# Patient Record
(demographics unavailable — no encounter records)

---

## 2024-10-31 NOTE — HISTORY OF PRESENT ILLNESS
[FreeTextEntry1] : NPV- FBSE [de-identified] : Romy Armando 48 y/o F presents for FBSE   Lists the following concerns today: -bump on anterior neck, picks at it. hasnt gone away in > months  Personal history of skin cancer: -BCC on L dorsal hand s/p WLE.  2023  SH diligent with SPF, does not wear hats bc of her thick hair works as a food and wine writer, travels to [place] frequently

## 2024-10-31 NOTE — PHYSICAL EXAM
[FreeTextEntry3] : Exam of external genitalia was deferred.  -On the trunk and upper/lower extremities bilaterally, are multiple scattered tan to brown macules and papules with regular borders. Some of these were examined dermoscopically and had reassuring features. -Scattered tan smooth macules with regular borders and pigmentation. Many of these were examined with dermoscopy and had benign features. -Scattered on the trunk and extremities are several cherry red papules. -anterior neck: scaly tan papule with cerebriform features at border -right thoracic back: brown macule with white surrounding halo, ill defined pigmentation on dermoscopy. 5x5mm

## 2024-10-31 NOTE — ASSESSMENT
[FreeTextEntry1] : #Skin cancer screening  Sun protection reviewed. The patient was educated regarding appropriate sun protection measures, including wearing sunscreen with SPF 30 or higher when outdoors, sun protective clothing, and sun avoidance.   #Cherry angioma #Benign appearing nevi #Solar lentigines Patient was reassured of the benign nature of these findings. No further treatment needed at this time. If any lesion changes or becomes symptomatic I recommend follow-up  # Seborrheic Keratoses, clinically inflamed vs. prurigo nodule Natural history and treatment options discussed. Recommend treatment with cryotherapy to which the patient is agreeable.   Cryotherapy Procedure Note. Lesion(s) / Location treated: 1, anterior neck After obtaining verbal consent, including counseling on risks of dyspigmentation and scarring, liquid nitrogen was applied to the above lesions. The patient tolerated the procedure well. Wound care was reviewed. Counselled pt to avoid picking. can apply hydrocolloid patch (pimple patch) to help cover lesion during healing.   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 DN , melanoma  Photographs obtained.   Recommended lesion removal by punch technique.  Reason: tumor of uncertain behavior / irritated lesion.   Excision by 5mm Punch Technique with Simple Closure Procedure Note.   Lesion size: 5mm   Cleaned with: alcohol wipe Closure Length: 0.5 cm. Simple closure: 4-0 Chromic interrupted skin sutures.   After discussion of risks, written informed consent was obtained.  a time out was performed.   Following intradermal injection of 1% lidocaine with 1:1000,000 epinephrine, an excision by punch technique was performed down to the subcutaneous tissue.   A bandage was placed and wound care was reviewed with the patient. The specimen was sent to Pathology for review.  Estimated blood loss: Minimal. Complications: none.  RTC 12 mo or sooner prn

## 2024-11-01 NOTE — HISTORY OF PRESENT ILLNESS
[Patient reported mammogram was normal] : Patient reported mammogram was normal [Patient reported PAP Smear was abnormal] : Patient reported PAP Smear was abnormal [Mammogramdate] : 12/04/2023 [PapSmeardate] : 03/21/2023 [ColonoscopyDate] : 12/16/22 [LMPDate] : 09/23/2024 [MensesFreq] : 6-8weeks [MensesLength] : 5 [TextBox_6] : 09/23/2024 [FreeTextEntry1] : 09/23/2024

## 2024-11-01 NOTE — PLAN
[FreeTextEntry1] : 46yo  premenopausal female PMH anxiety, abnormal pap with +HPV s/p multiple benign colposcopies and newly diagnosed HTN and HLD presents for annual exam and questions related to hormone replacement.  #Annual visits  - Pap cytology & HPV collected, f/u results  - Pt declines STI testing  - Pt s/p flu shot, COVID booster  - UTD with mammography, colonoscopy  #Perimenopausal symptoms/ contraception/ irregular menses  - Discussed with patient concerns related to Junel - given her age, newly diagnosed HTN and HLD it is no longer recommended to continue OCP use. Explained that using estrogen-containing contraception increases cardiovascular risk including MI and stroke. Recommended immediate discontinuation of Junel. Pt in understanding and agreement to stop Junel. - Discussed limited evidence for DHEA in alleviating perimenopausal symptoms and lab results showing elevated testosterone levels, and the possibility for testosterone to contribute to HTN.  - Discussed micronized progesterone is typically safe however using this in conjunction with Junel which contains progesterone is likely too high a dose and could be contributing to hyperlipidemia. Would not recommend concurrent use of multiple sources of systemic progesterone - Recommend follow up with [facility] certified physician Dr. Berger on  for further recommendations  - In the meantime reiterated that patient should STOP Junel. Can continue DHEA/micronized progesterone while awaiting further recommendations - Pt concerned about contraception given she believes she is still menstruating, recommended condom use for pregnancy prevention in the meantime. She is eligible for POP however would not recommend concurrent use with micronized progestin. Patient had negative experience with IUD in past (irregular bleeding).  - Patient notes that between recurrent HPV, bothersome abnormal bleeding patterns, and no desire for future fertility, she has been counseled about hysterectomy. She is possibly interested in this going forward however she notes she is moving to [place] in 2025.  - Patient will need further guidance on management of perimenopausal symptoms with desire for control over menstrual bleeding and contraception - Pt in understanding and agreement with plan, all questions answered - RTC on  for consultation with Dr. Celine Holland, PGY2 Pt seen and discussed with Dr. Curran attending physician

## 2024-11-01 NOTE — PHYSICAL EXAM
[Chaperone Present] : A chaperone was present in the examining room during all aspects of the physical examination [[id number]] : A chaperone was present during the pelvic exam. [Appropriately responsive] : appropriately responsive [Alert] : alert [No Acute Distress] : no acute distress [Labia Majora] : normal [Labia Minora] : normal [Discharge] : a  ~M vaginal discharge was present [Scant] : scant [Clear] : clear [Normal] : normal [Uterine Adnexae] : non-palpable [Oriented x3] : oriented x3 [FreeTextEntry2] : Tearful at times when discussing irregular menses and frustration related to this. [FreeTextEntry8] : No CMT, no adnexal fullness or tenderness to palpation

## 2024-11-01 NOTE — PLAN
[FreeTextEntry1] : 46yo  premenopausal female PMH anxiety, abnormal pap with +HPV s/p multiple benign colposcopies and newly diagnosed HTN and HLD presents for annual exam and questions related to hormone replacement.  #Annual visits  - Pap cytology & HPV collected, f/u results  - Pt declines STI testing  - Pt s/p flu shot, COVID booster  - UTD with mammography, colonoscopy  #Perimenopausal symptoms/ contraception/ irregular menses  - Discussed with patient concerns related to Junel - given her age, newly diagnosed HTN and HLD it is no longer recommended to continue OCP use. Explained that using estrogen-containing contraception increases cardiovascular risk including MI and stroke. Recommended immediate discontinuation of Junel. Pt in understanding and agreement to stop Junel. - Discussed limited evidence for DHEA in alleviating perimenopausal symptoms and lab results showing elevated testosterone levels, and the possibility for testosterone to contribute to HTN.  - Discussed micronized progesterone is typically safe however using this in conjunction with Junel which contains progesterone is likely too high a dose and could be contributing to hyperlipidemia. Would not recommend concurrent use of multiple sources of systemic progesterone - Recommend follow up with [facility] certified physician Dr. Berger on  for further recommendations  - In the meantime reiterated that patient should STOP Junel. Can continue DHEA/micronized progesterone while awaiting further recommendations - Pt concerned about contraception given she believes she is still menstruating, recommended condom use for pregnancy prevention in the meantime. She is eligible for POP however would not recommend concurrent use with micronized progestin. Patient had negative experience with IUD in past (irregular bleeding).  - Patient notes that between recurrent HPV, bothersome abnormal bleeding patterns, and no desire for future fertility, she has been counseled about hysterectomy. She is possibly interested in this going forward however she notes she is moving to [place] in 2025.  - Patient will need further guidance on management of perimenopausal symptoms with desire for control over menstrual bleeding and contraception - Pt in understanding and agreement with plan, all questions answered - RTC on  for consultation with Dr. Celine Holland, PGY2 Pt seen and discussed with Dr. Curran attending physician

## 2024-11-01 NOTE — PHYSICAL EXAM
[Chaperone Present] : A chaperone was present in the examining room during all aspects of the physical examination [[id number]] : A chaperone was present during the pelvic exam. [Appropriately responsive] : appropriately responsive [Alert] : alert [No Acute Distress] : no acute distress [Labia Majora] : normal [Labia Minora] : normal [Discharge] : a  ~M vaginal discharge was present [Scant] : scant [Clear] : clear [Normal] : normal [Uterine Adnexae] : non-palpable [Oriented x3] : oriented x3 [FreeTextEntry2] : Tearful at times when discussing irregular menses and frustration related to this. [FreeTextEntry8] : No CMT, no adnexal fullness or tenderness to palpation

## 2024-12-16 NOTE — PLAN
[FreeTextEntry1] : 48 yo presents for HRT consultation -patient advised that due to her HLD and not on any statin she should stop taking any HRT until she is cleared by cardiologist, patient is going to make an appointment with her cardiologist -FSH drawn today -patient to make appointment with clinic after she see's cardiologist to address HRT options but right now with her LDL>150 she needs clearance -patient has colposcopy next month and had mammogram two weeks ago -all questions answered and patient expressed understanding  NS PGY2

## 2024-12-16 NOTE — END OF VISIT
[] : Resident [FreeTextEntry3] : lengthy discussion with pt came off pill recently, no period since the withdrawal bleed from pill which was a few months ago she is taking a compounded hrt: d/w pt I recommend routine FDA approved products sold in regular pharmacy (like estrogen patch and micronized progesterone pill) but at moment she is could be at cardiac high risk she did see cardiologist NP in August but at that time they did not have recent LIPID and her lipids were much higher when checked in September. advised to see cardiologist now. d/w pt some herbal nonhormal remedies like Relizen. dw  pt also Veozah as an option that is totally nonhormonal but she will f/u after seeing cardiologist

## 2024-12-16 NOTE — HISTORY OF PRESENT ILLNESS
[Previously active] : previously active [PapSmeardate] : 10/31/2024 [TextBox_31] : HPV +    [place] schedule for JAN. 2025 [LMPDate] : 11/3/2024 [FreeTextEntry1] : 11/3/2024

## 2025-01-08 NOTE — REVIEW OF SYSTEMS
[Palpitations] : palpitations [Fever] : no fever [Chills] : no chills [SOB] : no shortness of breath [Dyspnea on exertion] : not dyspnea during exertion [Lower Ext Edema] : no extremity edema [Orthopnea] : no orthopnea [PND] : no PND [Syncope] : no syncope [Cough] : no cough [Nausea] : no nausea [Vomiting] : no vomiting [Diarrhea] : diarrhea [Dizziness] : no dizziness [Numbness (Hypoesthesia)] : no numbness [Weakness] : no weakness [Speech Disturbance] : no speech disturbance [Easy Bleeding] : no tendency for easy bleeding

## 2025-01-08 NOTE — HISTORY OF PRESENT ILLNESS
[FreeTextEntry1] : 49F with PMHx of HLD, HTN and palpitations presents today for follow up and go over monitor results.   Since last visit, overall doing well. She feel her palpitations have been stable for the past 20 + years. Symptoms would a occur in spurts a few times a week, then subside and then return again. Palpitations usually noted as an additional beat but on occasion can last a few seconds. She has noted mild light headedness in the past but no falls, syncope or neuro focal deficit.  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 She does admit to medium stress which might be slightly elevated recently given she is moving to [place] at the end of the month. She is not looking into taking or starting new medication at this moment given her upcoming move.   She remains active with walking about 3 times a week for 20 minutes. She remains with no chest pains or dyspnea on exertion.   When it comes to diet, she does not have a significant meat intake however she does have cheese daily with her courses.   She adds that she recently stopped OCP in November and would like to see how that has affected her cholesterol levels. She currently is not fasting.    CARDIOMETABOLIC RISK FACTORS: Family History: father with obstructive CAD (started early 70s), brother with double aortic arch?  Lifestyle History: -Mediterranean Diet Score (9 question survey) was 8 (optimal) -Exercise: YES, low level  minutes per week. -Smoking: NEVER -EtOH: YES, 1-7 drinks per week -Stress: Currently high usually medium -Sleep apnea: snores, not tired during the day  Do you have polycystic ovarian syndrome? NO Have you ever been pregnant? If so, how many times? YES, 1 Did you have any complications during pregnancy? NO Did you have any postpartum complications? NO Have you had any miscarriages? If so, how many? NO Have you gone through menopause? If yes, at what age? NO -----  Previous Work Up LABS: - lipid panel ( 10/13/2024 ) , HDL 62,  -lipid panel (08/2022): , Tchol 230, HDL 68,  -A1c (08/2022): 5.3%  ECHO ( 09/09/2024 )  1. Left ventricular cavity is normal in size. Left ventricular wall thickness is normal. Left ventricular systolic function is normal with an ejection fraction of 70 % by Echevarria's method of disks. There are no regional wall motion abnormalities seen. 2. Normal left ventricular diastolic function. 3. Normal right ventricular cavity size, with normal wall thickness, and normal right ventricular systolic function. 4. Normal left and right atrial size. 5. Mild mitral regurgitation. 6. Mild tricuspid regurgitation. 7. No pericardial effusion seen.  Monitor ( 10/28/2024 )  14 days SVT, 2 episodes with fastest at 154bpm for 20 beats Rare PVCs and PACs < 1%

## 2025-01-08 NOTE — DISCUSSION/SUMMARY
[FreeTextEntry1] : 49F with PMHx of HLD, HTN and palpitations presents today for follow up and go over monitor results.   #palpitations - EKG NSR 80bpm - Recent ECHO ( 09/2024 ) mild MR / mild TR otherwise unremarkable - 2 week monitor revealed episodes of SVT with longest at 20 beats at 154bpm - Pt will be adjusting lifestyle for now and deferring daily BBlocker given she will be moving   #elevated LDL - FVQ721 in 09/2024, above goal of <100.  - 10-year ASCVD risk score low at 1.7% - Patient is active without issues - Discussed lifestyle modifications including continue healthy exercise and eating habits, focusing on a Mediterranean style of eating and aiming for the recommended 150 minutes per week of moderate physical activity - Sent RX for repeat lipids, she would like to see numbers off of OCP

## 2025-01-08 NOTE — PHYSICAL EXAM
[Well Developed] : well developed [Well Nourished] : well nourished [No Acute Distress] : no acute distress [Normal S1, S2] : normal S1, S2 [No Murmur] : no murmur [Clear Lung Fields] : clear lung fields [Good Air Entry] : good air entry [No Respiratory Distress] : no respiratory distress  [Normal Gait] : normal gait [No Edema] : no edema [Moves all extremities] : moves all extremities [No Focal Deficits] : no focal deficits [Normal Speech] : normal speech [Alert and Oriented] : alert and oriented [Normal memory] : normal memory

## 2025-01-10 NOTE — PROCEDURE
[Colposcopy] : colposcopy [SCJ Fully Visualized] : the squamocolumnar junction was fully visualized [No Abnormalities] : no abnormalities seen [No Complications] : none [HPV high risk] : PCR positive for high risk HPV [Leukoplakia ___ o'clock] : no leukoplakia [Atypical Vessels ___ o'clock] : no atypical vessels [Biopsies Taken: # ___] : no biopsies were taken of the cervix

## 2025-01-10 NOTE — CHIEF COMPLAINT
[FreeTextEntry1] : 50 y/o presenting for colposcopy. PAP smear history reviewed with her in detail.  ObHx: TOP D&Cx1 GynHx: HPV+ ASCUS pap with colpo 7/2021. Currently sexually active w/ men. 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

## 2025-01-10 NOTE — HISTORY OF PRESENT ILLNESS
[FreeTextEntry1] : Patient was taking HRT with OCPs from 3/2024 to 11/2024, stopped due to HLD. Patient LMP was 11/24, denies any bleeding since. Denies hot flashes, vaginal dryness or any other s/s menopause. Sexually active with one male partner, does not use projection. Gardasil vaccination performed at 49 y/o.   Problem List 1. HPV+  Previous Therapy 1. Pap 5/3/21    a) ASCUS, HPV+ 2. Colpo 7/21   a) Cervix, 3 o'clock - benign cervical squamous mucosa   b) Cervix, 7 o'clock - benign cervical squamous mucosa   c) ECC - benign cervical glandular and squamous epithelium  3. Pap 3/21/23   a) Negative for intraepithelial lesion of malignancy, HPV positive  4. Colpo 4/28/23   a) ECC - superficial fragments of ectocervical and endocervical epithelium with mixed acute and chronic inflammation and reactive changes 5. Pap 10/31/24    a) Negative for intraepithelial lesion of malignancy, HPV positive (16/18/45 neg)  ObHx: TOP D&Cx1 GynHx: HPV+ ASCUS pap with colpo 7/2021. Currently sexually active w/ men. 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

## 2025-01-10 NOTE — ASSESSMENT
[FreeTextEntry1] : 50 y/o presenting for colposcopy for HPV+ with normal cytology and history of pap smears consistent with HPV+.   Colposcopy normal exam today. No HPV related changes noted.  ASCCP recommendations reviewed with patient. The recommendation is for colposcopic evaluation and biopsies if abnormal lesions noted. Colposcopy with no abnormal cells visualized, no biopsies needed. ECC performed and patient tolerated procedure well. Patient recently stopped taking OCPs and HRT due to HLD and had questions about pregnancy. Explained to patient that with FSH of 72 and no longer getting menses it is extremely unlikely (but theoretically it is still possible) for her to get pregnant.   Patient counseled on [facility] of 3000mg daily for 6-12 months to help clear her HPV. Pamphlet provided. All questions answered and patient expressed understanding.  [ ] ECC f/u results [ ] [facility]

## 2025-01-10 NOTE — PHYSICAL EXAM
[Chaperone Present] : A chaperone was present in the examining room during all aspects of the physical examination [[id number]] : A chaperone was present during the pelvic exam. [Normal] : Uterus: Normal size, no tenderness, no masses